# Patient Record
Sex: MALE | Race: WHITE | NOT HISPANIC OR LATINO | Employment: FULL TIME
[De-identification: names, ages, dates, MRNs, and addresses within clinical notes are randomized per-mention and may not be internally consistent; named-entity substitution may affect disease eponyms.]

---

## 2019-09-30 ENCOUNTER — HEALTH MAINTENANCE LETTER (OUTPATIENT)
Age: 59
End: 2019-09-30

## 2020-03-22 ENCOUNTER — HEALTH MAINTENANCE LETTER (OUTPATIENT)
Age: 60
End: 2020-03-22

## 2020-08-25 ENCOUNTER — AMBULATORY - HEALTHEAST (OUTPATIENT)
Dept: CARDIAC REHAB | Facility: HOSPITAL | Age: 60
End: 2020-08-25

## 2020-08-25 DIAGNOSIS — Z95.5 STENTED CORONARY ARTERY: ICD-10-CM

## 2020-09-01 ENCOUNTER — AMBULATORY - HEALTHEAST (OUTPATIENT)
Dept: CARDIAC REHAB | Facility: HOSPITAL | Age: 60
End: 2020-09-01

## 2020-09-01 DIAGNOSIS — Z95.5 STENTED CORONARY ARTERY: ICD-10-CM

## 2020-09-01 LAB
GLUCOSE BLDC GLUCOMTR-MCNC: 111 MG/DL (ref 70–139)
GLUCOSE BLDC GLUCOMTR-MCNC: 94 MG/DL (ref 70–139)

## 2020-09-03 ENCOUNTER — AMBULATORY - HEALTHEAST (OUTPATIENT)
Dept: CARDIAC REHAB | Facility: HOSPITAL | Age: 60
End: 2020-09-03

## 2020-09-03 DIAGNOSIS — Z95.5 STENTED CORONARY ARTERY: ICD-10-CM

## 2020-09-08 ENCOUNTER — AMBULATORY - HEALTHEAST (OUTPATIENT)
Dept: CARDIAC REHAB | Facility: HOSPITAL | Age: 60
End: 2020-09-08

## 2020-09-08 DIAGNOSIS — Z95.5 STENTED CORONARY ARTERY: ICD-10-CM

## 2020-09-10 ENCOUNTER — AMBULATORY - HEALTHEAST (OUTPATIENT)
Dept: CARDIAC REHAB | Facility: HOSPITAL | Age: 60
End: 2020-09-10

## 2020-09-10 DIAGNOSIS — Z95.5 STENTED CORONARY ARTERY: ICD-10-CM

## 2020-09-15 ENCOUNTER — AMBULATORY - HEALTHEAST (OUTPATIENT)
Dept: CARDIAC REHAB | Facility: HOSPITAL | Age: 60
End: 2020-09-15

## 2020-09-15 DIAGNOSIS — Z95.5 STENTED CORONARY ARTERY: ICD-10-CM

## 2020-09-17 ENCOUNTER — AMBULATORY - HEALTHEAST (OUTPATIENT)
Dept: CARDIAC REHAB | Facility: HOSPITAL | Age: 60
End: 2020-09-17

## 2020-09-17 DIAGNOSIS — Z95.5 STENTED CORONARY ARTERY: ICD-10-CM

## 2020-09-22 ENCOUNTER — AMBULATORY - HEALTHEAST (OUTPATIENT)
Dept: CARDIAC REHAB | Facility: HOSPITAL | Age: 60
End: 2020-09-22

## 2020-09-22 DIAGNOSIS — Z95.5 STENTED CORONARY ARTERY: ICD-10-CM

## 2020-09-24 ENCOUNTER — AMBULATORY - HEALTHEAST (OUTPATIENT)
Dept: CARDIAC REHAB | Facility: HOSPITAL | Age: 60
End: 2020-09-24

## 2020-09-24 DIAGNOSIS — Z95.5 STENTED CORONARY ARTERY: ICD-10-CM

## 2020-09-29 ENCOUNTER — AMBULATORY - HEALTHEAST (OUTPATIENT)
Dept: CARDIAC REHAB | Facility: HOSPITAL | Age: 60
End: 2020-09-29

## 2020-09-29 DIAGNOSIS — Z95.5 STENTED CORONARY ARTERY: ICD-10-CM

## 2020-10-01 ENCOUNTER — AMBULATORY - HEALTHEAST (OUTPATIENT)
Dept: CARDIAC REHAB | Facility: HOSPITAL | Age: 60
End: 2020-10-01

## 2020-10-01 DIAGNOSIS — Z95.5 STENTED CORONARY ARTERY: ICD-10-CM

## 2020-10-06 ENCOUNTER — AMBULATORY - HEALTHEAST (OUTPATIENT)
Dept: CARDIAC REHAB | Facility: HOSPITAL | Age: 60
End: 2020-10-06

## 2020-10-06 DIAGNOSIS — Z95.5 STENTED CORONARY ARTERY: ICD-10-CM

## 2020-10-08 ENCOUNTER — AMBULATORY - HEALTHEAST (OUTPATIENT)
Dept: CARDIAC REHAB | Facility: HOSPITAL | Age: 60
End: 2020-10-08

## 2020-10-08 DIAGNOSIS — Z95.5 STENTED CORONARY ARTERY: ICD-10-CM

## 2020-10-12 ENCOUNTER — AMBULATORY - HEALTHEAST (OUTPATIENT)
Dept: CARDIAC REHAB | Facility: HOSPITAL | Age: 60
End: 2020-10-12

## 2020-10-12 DIAGNOSIS — Z95.5 STENTED CORONARY ARTERY: ICD-10-CM

## 2020-10-14 ENCOUNTER — AMBULATORY - HEALTHEAST (OUTPATIENT)
Dept: CARDIAC REHAB | Facility: HOSPITAL | Age: 60
End: 2020-10-14

## 2020-10-14 DIAGNOSIS — Z95.5 STENTED CORONARY ARTERY: ICD-10-CM

## 2020-10-19 ENCOUNTER — AMBULATORY - HEALTHEAST (OUTPATIENT)
Dept: CARDIAC REHAB | Facility: HOSPITAL | Age: 60
End: 2020-10-19

## 2020-10-19 DIAGNOSIS — Z95.5 STENTED CORONARY ARTERY: ICD-10-CM

## 2020-10-21 ENCOUNTER — AMBULATORY - HEALTHEAST (OUTPATIENT)
Dept: CARDIAC REHAB | Facility: HOSPITAL | Age: 60
End: 2020-10-21

## 2020-10-21 DIAGNOSIS — Z95.5 STENTED CORONARY ARTERY: ICD-10-CM

## 2020-10-21 DIAGNOSIS — I21.4 NSTEMI (NON-ST ELEVATED MYOCARDIAL INFARCTION) (H): ICD-10-CM

## 2020-10-22 LAB
GLUCOSE BLDC GLUCOMTR-MCNC: 161 MG/DL (ref 70–139)
GLUCOSE BLDC GLUCOMTR-MCNC: 166 MG/DL (ref 70–139)

## 2020-10-26 ENCOUNTER — AMBULATORY - HEALTHEAST (OUTPATIENT)
Dept: CARDIAC REHAB | Facility: HOSPITAL | Age: 60
End: 2020-10-26

## 2020-10-26 DIAGNOSIS — Z95.5 STENTED CORONARY ARTERY: ICD-10-CM

## 2020-10-26 DIAGNOSIS — I21.4 NSTEMI (NON-ST ELEVATED MYOCARDIAL INFARCTION) (H): ICD-10-CM

## 2020-10-28 ENCOUNTER — AMBULATORY - HEALTHEAST (OUTPATIENT)
Dept: CARDIAC REHAB | Facility: HOSPITAL | Age: 60
End: 2020-10-28

## 2020-10-28 DIAGNOSIS — I21.4 NSTEMI (NON-ST ELEVATED MYOCARDIAL INFARCTION) (H): ICD-10-CM

## 2020-10-28 DIAGNOSIS — Z95.5 STENTED CORONARY ARTERY: ICD-10-CM

## 2020-11-02 ENCOUNTER — AMBULATORY - HEALTHEAST (OUTPATIENT)
Dept: CARDIAC REHAB | Facility: HOSPITAL | Age: 60
End: 2020-11-02

## 2020-11-02 DIAGNOSIS — Z95.5 STENTED CORONARY ARTERY: ICD-10-CM

## 2020-11-02 DIAGNOSIS — I21.4 NSTEMI (NON-ST ELEVATED MYOCARDIAL INFARCTION) (H): ICD-10-CM

## 2020-11-04 ENCOUNTER — AMBULATORY - HEALTHEAST (OUTPATIENT)
Dept: CARDIAC REHAB | Facility: HOSPITAL | Age: 60
End: 2020-11-04

## 2020-11-04 DIAGNOSIS — I21.4 NSTEMI (NON-ST ELEVATED MYOCARDIAL INFARCTION) (H): ICD-10-CM

## 2020-11-04 DIAGNOSIS — Z95.5 STENTED CORONARY ARTERY: ICD-10-CM

## 2020-11-09 ENCOUNTER — AMBULATORY - HEALTHEAST (OUTPATIENT)
Dept: CARDIAC REHAB | Facility: HOSPITAL | Age: 60
End: 2020-11-09

## 2020-11-09 DIAGNOSIS — Z95.5 STENTED CORONARY ARTERY: ICD-10-CM

## 2020-11-09 DIAGNOSIS — I21.4 NSTEMI (NON-ST ELEVATED MYOCARDIAL INFARCTION) (H): ICD-10-CM

## 2020-11-11 ENCOUNTER — AMBULATORY - HEALTHEAST (OUTPATIENT)
Dept: CARDIAC REHAB | Facility: HOSPITAL | Age: 60
End: 2020-11-11

## 2020-11-11 DIAGNOSIS — I21.4 NSTEMI (NON-ST ELEVATED MYOCARDIAL INFARCTION) (H): ICD-10-CM

## 2020-11-11 DIAGNOSIS — Z95.5 STENTED CORONARY ARTERY: ICD-10-CM

## 2020-11-30 ENCOUNTER — AMBULATORY - HEALTHEAST (OUTPATIENT)
Dept: CARDIAC REHAB | Facility: HOSPITAL | Age: 60
End: 2020-11-30

## 2020-11-30 DIAGNOSIS — I21.4 NSTEMI (NON-ST ELEVATED MYOCARDIAL INFARCTION) (H): ICD-10-CM

## 2020-11-30 DIAGNOSIS — Z95.5 STENTED CORONARY ARTERY: ICD-10-CM

## 2020-12-02 ENCOUNTER — AMBULATORY - HEALTHEAST (OUTPATIENT)
Dept: CARDIAC REHAB | Facility: HOSPITAL | Age: 60
End: 2020-12-02

## 2020-12-02 DIAGNOSIS — Z95.5 STENTED CORONARY ARTERY: ICD-10-CM

## 2020-12-02 DIAGNOSIS — I21.4 NSTEMI (NON-ST ELEVATED MYOCARDIAL INFARCTION) (H): ICD-10-CM

## 2020-12-07 ENCOUNTER — AMBULATORY - HEALTHEAST (OUTPATIENT)
Dept: CARDIAC REHAB | Facility: HOSPITAL | Age: 60
End: 2020-12-07

## 2020-12-07 DIAGNOSIS — Z95.5 STENTED CORONARY ARTERY: ICD-10-CM

## 2020-12-07 DIAGNOSIS — I21.4 NSTEMI (NON-ST ELEVATED MYOCARDIAL INFARCTION) (H): ICD-10-CM

## 2020-12-09 ENCOUNTER — AMBULATORY - HEALTHEAST (OUTPATIENT)
Dept: CARDIAC REHAB | Facility: HOSPITAL | Age: 60
End: 2020-12-09

## 2020-12-09 DIAGNOSIS — I21.4 NSTEMI (NON-ST ELEVATED MYOCARDIAL INFARCTION) (H): ICD-10-CM

## 2020-12-09 DIAGNOSIS — Z95.5 STENTED CORONARY ARTERY: ICD-10-CM

## 2020-12-14 ENCOUNTER — AMBULATORY - HEALTHEAST (OUTPATIENT)
Dept: CARDIAC REHAB | Facility: HOSPITAL | Age: 60
End: 2020-12-14

## 2020-12-14 DIAGNOSIS — Z95.5 STENTED CORONARY ARTERY: ICD-10-CM

## 2020-12-14 DIAGNOSIS — I21.4 NSTEMI (NON-ST ELEVATED MYOCARDIAL INFARCTION) (H): ICD-10-CM

## 2020-12-16 ENCOUNTER — AMBULATORY - HEALTHEAST (OUTPATIENT)
Dept: CARDIAC REHAB | Facility: HOSPITAL | Age: 60
End: 2020-12-16

## 2020-12-16 DIAGNOSIS — I21.4 NSTEMI (NON-ST ELEVATED MYOCARDIAL INFARCTION) (H): ICD-10-CM

## 2020-12-16 DIAGNOSIS — Z95.5 STENTED CORONARY ARTERY: ICD-10-CM

## 2020-12-21 ENCOUNTER — AMBULATORY - HEALTHEAST (OUTPATIENT)
Dept: CARDIAC REHAB | Facility: HOSPITAL | Age: 60
End: 2020-12-21

## 2020-12-21 DIAGNOSIS — I21.4 NSTEMI (NON-ST ELEVATED MYOCARDIAL INFARCTION) (H): ICD-10-CM

## 2020-12-21 DIAGNOSIS — Z95.5 STENTED CORONARY ARTERY: ICD-10-CM

## 2020-12-28 ENCOUNTER — AMBULATORY - HEALTHEAST (OUTPATIENT)
Dept: CARDIAC REHAB | Facility: HOSPITAL | Age: 60
End: 2020-12-28

## 2020-12-28 DIAGNOSIS — Z95.5 STENTED CORONARY ARTERY: ICD-10-CM

## 2020-12-28 DIAGNOSIS — I21.4 NSTEMI (NON-ST ELEVATED MYOCARDIAL INFARCTION) (H): ICD-10-CM

## 2020-12-30 ENCOUNTER — AMBULATORY - HEALTHEAST (OUTPATIENT)
Dept: CARDIAC REHAB | Facility: HOSPITAL | Age: 60
End: 2020-12-30

## 2020-12-30 DIAGNOSIS — Z95.5 STENTED CORONARY ARTERY: ICD-10-CM

## 2020-12-30 DIAGNOSIS — I21.4 NSTEMI (NON-ST ELEVATED MYOCARDIAL INFARCTION) (H): ICD-10-CM

## 2021-01-15 ENCOUNTER — HEALTH MAINTENANCE LETTER (OUTPATIENT)
Age: 61
End: 2021-01-15

## 2021-05-09 ENCOUNTER — HEALTH MAINTENANCE LETTER (OUTPATIENT)
Age: 61
End: 2021-05-09

## 2021-05-13 ENCOUNTER — RECORDS - HEALTHEAST (OUTPATIENT)
Dept: ADMINISTRATIVE | Facility: OTHER | Age: 61
End: 2021-05-13

## 2021-05-14 ENCOUNTER — RECORDS - HEALTHEAST (OUTPATIENT)
Dept: ADMINISTRATIVE | Facility: OTHER | Age: 61
End: 2021-05-14

## 2021-05-28 ENCOUNTER — RECORDS - HEALTHEAST (OUTPATIENT)
Dept: ADMINISTRATIVE | Facility: CLINIC | Age: 61
End: 2021-05-28

## 2021-06-04 VITALS — WEIGHT: 315 LBS | BODY MASS INDEX: 45.2 KG/M2

## 2021-06-04 VITALS — BODY MASS INDEX: 45.34 KG/M2 | WEIGHT: 315 LBS

## 2021-06-04 VITALS — BODY MASS INDEX: 45.63 KG/M2 | WEIGHT: 315 LBS

## 2021-06-04 VITALS — WEIGHT: 315 LBS | BODY MASS INDEX: 45.34 KG/M2

## 2021-06-05 VITALS — BODY MASS INDEX: 45.2 KG/M2 | WEIGHT: 315 LBS

## 2021-06-05 VITALS — WEIGHT: 315 LBS | BODY MASS INDEX: 45.34 KG/M2

## 2021-06-05 VITALS — WEIGHT: 315 LBS | BODY MASS INDEX: 46.06 KG/M2

## 2021-06-05 VITALS — BODY MASS INDEX: 45.63 KG/M2 | WEIGHT: 315 LBS

## 2021-06-05 VITALS — WEIGHT: 315 LBS | BODY MASS INDEX: 45.48 KG/M2

## 2021-06-05 VITALS — BODY MASS INDEX: 45.48 KG/M2 | WEIGHT: 315 LBS

## 2021-06-05 VITALS — BODY MASS INDEX: 46.06 KG/M2 | WEIGHT: 315 LBS

## 2021-06-05 VITALS — BODY MASS INDEX: 47.21 KG/M2 | WEIGHT: 315 LBS

## 2021-06-05 VITALS — WEIGHT: 315 LBS | BODY MASS INDEX: 45.92 KG/M2

## 2021-06-05 VITALS — BODY MASS INDEX: 45.34 KG/M2 | WEIGHT: 315 LBS

## 2021-06-05 VITALS — BODY MASS INDEX: 45.05 KG/M2 | WEIGHT: 314 LBS

## 2021-06-05 VITALS — BODY MASS INDEX: 47.35 KG/M2 | WEIGHT: 315 LBS

## 2021-06-05 VITALS — WEIGHT: 315 LBS | BODY MASS INDEX: 45.63 KG/M2

## 2021-06-05 VITALS — WEIGHT: 315 LBS | BODY MASS INDEX: 46.2 KG/M2

## 2021-06-09 ENCOUNTER — RECORDS - HEALTHEAST (OUTPATIENT)
Dept: ADMINISTRATIVE | Facility: CLINIC | Age: 61
End: 2021-06-09

## 2021-06-11 NOTE — PROGRESS NOTES
Cardiac Rehab  Phase II Assessment    Assessment Date: 9/1/2020    Diagnosis: PCI/Stent  Date of Onset: 8/18/2020  Procedure: PCI?Stent  Date of Onset: 8/18/2020  ICD/Pacemaker: Yes   Post-op Complications:   ECG History: ICD/V-Fib; NSR; Right % EF%:30%  Past Medical History:   Patient Active Problem List   Diagnosis     Morbid Obesity     Hypertension     Ischemic Cardiomyopathy     Type 2 Diabetes Mellitus     Hyperlipidemia   CVA; ICM:;STEMI; Stent 2005; CHF NYHA Class 1;   ICD 2015; Recent  ICD Shocks; 8/11 and 8/12/2020  Chronic Systolic CHF;  5th toe amputation Right Foot      Physical Assessment  Precautions/ Physical Limitations: Low EF;  Recent toe amputation   Oxygen: No  O2 Sats: 97-99 Lung Sounds:  Edema:   Incisions:   Sleeping Pattern: good   Appetite: good   Nutrition Risk Screen: Weight loss    Pain  Location:   Characteristics:  Intensity: (0-10 scale) 0  Current Pain Management:   Intervention:   Response:     Psychosocial/ Emotional Health  1. In the past 12 months, have you been in a relationship where you have been abused physically, emotionally, sexually or financially? No  notified: NA  2. Who do you turn to for emotional support?: Wife  3. Do you have cultural or spiritual needs? No  4. Have there been any major life changes in the past 12 months? No    Referral Information  Primary Physician: Jaime Cassidy MD  Cardiologist:   Surgeon:     Home exercise/Equipment: Treadmill    Patient's long-term goal(s): Resume Reffing Basketball    1. Living Accommodations: Home Steps: Yes      Support people at home: Wife and 2 kids   2. Marital Status:   3. Family is able to assist with cares      Religious/Community involvement: Yes  4. Recreation/Hobbies: [employer]  Reffing basketball and Football          See Doc Flowsheet

## 2021-06-11 NOTE — PROGRESS NOTES
ITP ASSESSMENT   Assessment Day: Initial    Session Number: 1  Precautions:   Recent 5th toe amp on Right foot ;   Low EF    Diagnosis: Stent;MI;CHF    Risk Stratification: High    Referring Provider: Keyshawn Torres MD  EXERCISE  Exercise Assessment: Initial       6 Minute Walk Test   Pre   Pre Exercise HR: 63                    Pre Exercise BP: 148/67      Peak  Peak HR: 85                   Peak BP: 153/65    Peak feet: 1125    Peak O2 SAT: 99    Peak RPE: 5    Peak MPH: 2.13      Symptoms:  Peak Symptoms: NONE      5 mins. Post  5 Min Post HR: 66    5 Min Post BP: 130/64                           Exercise Plan  Goals Next 30 days  : ST Goal;  Pt will tolerate 40 mins of ex at 2.6-3.2 mets without CV symptoms ;  ST Goal # 2  Pt will walk 2-3 x daiy; 5-10 Mins without CV Symptoms    Goal #3:  Pt will lose 2-3 # per month with Portion control and increasing Activity    : LTG; Pt will tolerate activity at 4-5 Mets to tolerate Reffing basketball games,         Education Goals: All goals in this section met    Education Goals Met: Patient can state cardiac s/s and appropriate emergency response.;Medication review.;Has system for taking medication.      Exercise Prescription  Exercise Mode: Treadmill;Bike;Nustep;Arm Erg.    Frequency: 2 x week    Duration: 40 Mins    Intensity / THR: 20-30 beats above resting heart rate    RPE 11-14  Progression / Met level: 2.6-3.2    Resistive Training?: Yes      Current Exercise (mins/week): 1      Interventions  Home Exercise:  Mode: Walk    Frequency: 2-3 x daily    Duration: 5-10 Mins      Education Material : Educational videos;Provide written material;Individual education and counseling      Education Completed  Exercise Education Completed: Signs and Symptoms;Medication review;Emergency Plan;RPE;Home Exercise;Warm up/cool down;FITT Principles;BP/HR Reponse to exercise;Benefits of Exercise;End point of exercise              Exercise Follow-up/Discharge  Follow up/Discharge:  "Skilled Therapy required to Monitor CV response to increasing Met levels;  To provide risk factor education and suppoer with Modifications;  Pt's ICD recently went off x 2 in August  Needs continued monitoring with increasing activity   NUTRITION  Nutrition Assessment: Initial      Nutrition Risk Factors:  Nutrition Risk Factors: Diabetes;Dyslipidemia;Overweight  HbA1c: 9.6  Monitors blood sugar at home: Yes  Frequency: 3-4 x day  Cholesterol: 97  LDL: 74  HDL: 25  Triglycerides: 77      Nutrition Plan  Interventions  Other Nutrition Intervention: Therapist/Pt Discussion;Educational Videos;Provide with Written Material    Initial Rate Your Plate Score: 48      Education Completed  Nutrition Education Completed: Risk factor overview;Low sodium diet      Goals  Nutrition Goals (Next 30 days): Provide Rate your Plate Survey;Review Dietitian schedule;Patient will follow a low sodium diet;Patient will lose weight;Patient demonstrated understanding of cardiac nutrition, no goals identified for the next 30 days      Goals Met  Nutrition Goals Met: Patient can identify their risk factors for CAD;Provided Rate your Plate Survey;Reviewed Dietitian schedule      Height, Weight, and  BMI  Weight: 315 lb (142.9 kg)  Height: 5' 10\" (1.778 m)  BMI: 45.2      Nutrition Follow-up  Follow-up/Discharge: Will set up a tome for pt to meet with the dietician         Other Risk Factors  Other Risk Factor Assessment: Initial      HTN Risk Factor: Hypertension      Pre Exercise BP: 148/67  Post Exercise BP: 130/64      Hypertension Plan  Goals  HTN Goals: Follow low sodium diet;Take medication as prescribed;Exercises regularly      Goals Met  HTN Goals Met: Take medication as prescribed;Follow low sodium diet      HTN Interventions  HTN Interventions: Diet consult;Therapist/patient discussion;Provide written material;Offer educational videos      HTN Education Completed  HTN Education Completed: Medication review;Risk factor " overview      Tobacco Risk Factor: NA        Risk Factor Follow-up   Follow-up/Discharge: Will provide risk factor ed as needed     PSYCHOSOCIAL  Psychosocial Assessment: Initial       [facility] MINA Q of L Summary Score: 15      PHQ-9 Total Score: 0      Psychosocial Risk Factor: NA      Psychosocial Plan  Interventions    Interventions: Provide written material;Individual education and counseling       Education Completed  Education Completed: Effects of stress on body      Goals  Goals (Next 30 days): Practicing stress management skills      Goals Met  Goals Met: Identified Support system;Oriented to stress management classes;Identify stressors      Psychosocial Follow-up  Follow-up/Discharge: Denied having stess             Patient involved in Goal setting?: Yes      Signature: _____________________________________________________________    Date: __________________    Time: ________________

## 2021-06-11 NOTE — PROGRESS NOTES
ITP ASSESSMENT   Assessment Day: 30 Day    Session Number: 9  Precautions: Low EF, toe amputation    Diagnosis: Stent;MI;CHF    Risk Stratification: High    Referring Provider: Jaime Cassidy MD  EXERCISE  Exercise Assessment: Reassessment                            Exercise Plan  Goals Next 30 days  STG: Tolerate 2.5-3 METs for 40 minutes without CV sx    STG: Lose 2-3lbs     LTG; Pt will tolerate activity at 4-5 Mets to tolerate Reffing basketball games,       Education Goals: All goals in this section met    Education Goals Met: Patient can state cardiac s/s and appropriate emergency response.;Has system for taking medication.;Medication review.                          Goals Met  Initial ADL's goals met: Pt has reached 2.7 METs - goal met    Initial Leisure goals met: Pt has not lost wt - goal not met      Initial Progression: Pt is progressing slowly, limited by recent toe amputation (does not tolerate standing for long periods of time), and general deconditioning.      Exercise Prescription  Exercise Mode: Treadmill;Bike;Nustep;Arm Erg.    Frequency: 2x/week    Duration: 40 minutes    Intensity / THR: 20-30 beats above resting heart rate    RPE 11-14  Progression / Met level: 2.5-3    Resistive Training?: Yes      Current Exercise (mins/week): 80      Interventions  Home Exercise:  Mode: Walk    Frequency: 2-3x/day    Duration: 5-10 minutes      Education Material : Educational videos;Provide written material;Individual education and counseling      Education Completed  Exercise Education Completed: Signs and Symptoms;Medication review;Emergency Plan;RPE;Home Exercise;Warm up/cool down;FITT Principles;BP/HR Reponse to exercise;Benefits of Exercise;End point of exercise              Exercise Follow-up/Discharge  Follow up/Discharge: Skilled Therapy required to Monitor CV response to increasing MET levels;  To provide risk factor education and support with Modifications;  Pt's ICD recently went off x 2 in August   "Needs continued monitoring with increasing activity           NUTRITION  Nutrition Assessment: Reassessment      Nutrition Risk Factors:  Nutrition Risk Factors: Diabetes;Dyslipidemia;Overweight  HbA1c: 9.6  Monitors blood sugar at home: Yes  Frequency: 3-4x/day  Cholesterol: 97  LDL: 47  HDL: 25  Triglycerides: 77      Nutrition Plan  Interventions  Diet Consult: Completed    Other Nutrition Intervention: Diet Class;Therapist/Pt Discussion;Educational Videos;Provide with Written Material    Initial Rate Your Plate Score: 48      Education Completed  Nutrition Education Completed: Low Saturated fat diet;Low sodium diet;Weight management;Carbohydrate counting;Risk factor overview      Goals  Nutrition Goals (Next 30 days): Patient will lose weight;Improve Rate Your Plate Survey Score      Goals Met  Nutrition Goals Met: Patient states following a low saturated fat diet;Patient can identify their risk factors for CAD;Completed Nutritional Risk Screen;Provided Rate your Plate Survey;Reviewed Dietitian schedule      Height, Weight, and  BMI  Weight: 317 lb (143.8 kg)  Height: 5' 10\" (1.778 m)  BMI: 45.48      Nutrition Follow-up  Follow-up/Discharge: RD available for questions as needed       Other Risk Factors  Other Risk Factor Assessment: Reassessment      HTN Risk Factor: Hypertension      Pre Exercise BP: 92/60  Post Exercise BP: 124/62      Hypertension Plan  Goals  HTN Goals: Follow low sodium diet;Take medication as prescribed;Exercises regularly      Goals Met  HTN Goals Met: Take medication as prescribed;Follow low sodium diet      HTN Interventions  HTN Interventions: Diet consult;Therapist/patient discussion;Provide written material;Offer educational videos      HTN Education Completed  HTN Education Completed: Medication review;Risk factor overview      Tobacco Risk Factor: NA      Risk Factor Follow-up   Follow-up/Discharge: Reviewed risk factors, will continue to provide education         " PSYCHOSOCIAL  Psychosocial Assessment: Reassessment       [facility] MIAN Q of L Summary Score: 15      PHQ-9 Total Score: 0      Psychosocial Risk Factor: NA      Psychosocial Plan  Interventions  Interventions: Provide written material;Individual education and counseling;Offer educational videos and classes      Education Completed  Education Completed: Relaxation/Coping Techniques;Effects of stress on body      Goals  Goals (Next 30 days): Patient demonstrates understanding of stress, no goals identified for the next 30 days      Goals Met  Goals Met: Identified Support system;Oriented to stress management classes;Identify stressors;Practicing stress management skills      Psychosocial Follow-up  Follow-up/Discharge: Continues to deny stress or psychosocial needs at this time. Reviewed effects of stress on the heart and importance of effective stress mgmt           Patient involved in Goal setting?: Yes      Signature: _____________________________________________________________    Date: __________________    Time: __________________

## 2021-06-12 NOTE — PROGRESS NOTES
ITP ASSESSMENT   Assessment Day: 60 Day    Session Number: 17  Precautions: Low EF, R toe amputation    Diagnosis: Stent;MI;CHF    Risk Stratification: High    Referring Provider: Jaime Cassidy MD  EXERCISE  Exercise Assessment: Reassessment    Pt has attending 17 sessions of rehab at a peak of 2.8 METs without CV sx, see doc flowsheets                         Exercise Plan  Goals Next 30 days  STG: Tolerate 2.5-3 METs for 40 minutes without CV sx     STG: Lose 2-3lbs     LTG; Pt will tolerate activity at 4-5 Mets to tolerate Reffing basketball games,       Education Goals: All goals in this section met    Education Goals Met: Patient can state cardiac s/s and appropriate emergency response.;Has system for taking medication.;Medication review.                          Goals Met  30 day ADL'S goals met: Pt has reached 2.8 METs - goal met    30 day Leisure goals met: Pt has lost 2 lbs- goal met      30 Day Progression: Pt is making slow gradual progress. Limited by fatigue and lack of endurance      Initial ADL's goals met: Pt has reached 2.7 METs - goal met    Initial Leisure goals met: Pt has not lost wt - goal not met      Initial Progression: Pt is progressing slowly, limited by recent toe amputation (does not tolerate standing for long periods of time), and general deconditioning.      Exercise Prescription  Exercise Mode: Treadmill;Bike;Nustep;Arm Erg.    Frequency: 2x/week    Duration: 40 minutes    Intensity / THR: 20-30 beats above resting heart rate    RPE 11-14  Progression / Met level: 2.5-3    Resistive Training?: Yes      Current Exercise (mins/week): 120      Interventions  Home Exercise:  Mode: Walk    Frequency: 4-5x/week    Duration: 15-25 minutes      Education Material : Educational videos;Provide written material;Individual education and counseling      Education Completed  Exercise Education Completed: Signs and Symptoms;Medication review;Emergency Plan;RPE;Home Exercise;Warm up/cool down;FITT  "Principles;BP/HR Reponse to exercise;Benefits of Exercise;End point of exercise              Exercise Follow-up/Discharge  Follow up/Discharge: Skilled Therapy required to Monitor CV response to increasing MET levels;  To provide risk factor education and support with Modifications;  Pt's ICD recently went off x 2 in August  Needs continued monitoring with increasing activity   NUTRITION  Nutrition Assessment: Reassessment      Nutrition Risk Factors:  Nutrition Risk Factors: Diabetes;Dyslipidemia;Overweight  HbA1c: 9.6  Monitors blood sugar at home: Yes  Frequency: 3-4x/day  Cholesterol: 97  LDL: 47  HDL: 25  Triglycerides: 77      Nutrition Plan  Interventions  Diet Consult: Completed    Other Nutrition Intervention: Diet Class;Therapist/Pt Discussion;Educational Videos;Provide with Written Material    Initial Rate Your Plate Score: 48      Education Completed  Nutrition Education Completed: Low Saturated fat diet;Low sodium diet;Weight management;Carbohydrate counting;Risk factor overview      Goals  Nutrition Goals (Next 30 days): Patient will lose weight;Improve Rate Your Plate Survey Score      Goals Met  Nutrition Goals Met: Patient states following a low saturated fat diet;Patient can identify their risk factors for CAD;Completed Nutritional Risk Screen;Provided Rate your Plate Survey;Reviewed Dietitian schedule      Height, Weight, and  BMI  Weight: 315 lb (142.9 kg)  Height: 5' 10\" (1.778 m)  BMI: 45.2      Nutrition Follow-up  Follow-up/Discharge: RD available for questions as needed         Other Risk Factors  Other Risk Factor Assessment: Reassessment      HTN Risk Factor: Hypertension      Pre Exercise BP: 108/58  Post Exercise BP: 112/62      Hypertension Plan  Goals  HTN Goals: Patient demonstrates understanding of HTN, no goals identified for the next 30 days      Goals Met  HTN Goals Met: Follow low sodium diet;Take medication as prescribed;Exercises regularly      HTN Interventions  HTN " Interventions: Diet consult;Therapist/patient discussion;Provide written material;Offer educational videos;Offer educational classes      HTN Education Completed  HTN Education Completed: Medication review;Risk factor overview;Low sodium diet      Tobacco Risk Factor: NA      Risk Factor Follow-up   Follow-up/Discharge: Will continue to provide education     PSYCHOSOCIAL  Psychosocial Assessment: Reassessment       Terrence MCKEON zaynab L Summary Score: 15      PHQ-9 Total Score: 0      Psychosocial Risk Factor: NA      Psychosocial Plan  Interventions  Interventions: Provide written material;Individual education and counseling;Offer educational videos and classes      Education Completed  Education Completed: Relaxation/Coping Techniques;Effects of stress on body      Goals  Goals (Next 30 days): Patient demonstrates understanding of stress, no goals identified for the next 30 days      Goals Met  Goals Met: Identified Support system;Oriented to stress management classes;Identify stressors;Practicing stress management skills      Psychosocial Follow-up  Follow-up/Discharge: Reviewed effects of stress on the heart, pt continued to deny stress at this time             Patient involved in Goal setting?: Yes      Signature: _____________________________________________________________    Date: __________________    Time: __________________

## 2021-06-13 NOTE — PROGRESS NOTES
ITP ASSESSMENT   Assessment Day: 90 Day    Session Number: 22  Precautions: Low EF, R Toe Amputation    Diagnosis: Stent;MI;CHF    Risk Stratification: High    Referring Provider: Jaime Cassidy MD   ITP: Dr. Del Toro  EXERCISE  Exercise Assessment: Reassessment    Pt has attended 22 sessions of Phase II Cardiac Rehab. Tolerating interval training with Met levels up to 6.7 mets for 30 second bouts. Pt has not had any cardiac symptoms or EKG changes. Pt is currently on hold due to Covid-19.                          Exercise Plan  Goals Next 30 days  ADL'S: STG: Tolerate 3-4 METs for 40 minutes without CV sx    Leisure: STG: Lose 2-3lbs    Work: LTG; Pt will tolerate activity at 6-7 Mets to tolerate Reffing basketball games,       Education Goals: All goals in this section met    Education Goals Met: Patient can state cardiac s/s and appropriate emergency response.;Has system for taking medication.;Medication review.                          Goals Met  60 day ADL'S goals met: Goal Met - Pt does interval training program reaching up to 6.7 mets for 30 seconds at at time.    60 day Leisure goals met: Goal Not Met - Pt has maintained his weight throughout the program within 2 lbs from when he started.    60 day Work goals met: Goal Not Met - Pt is tolerating met levels for short bouts and will increase as tolerated.    60 Day Progression: Pt is progressing well towards his goals. Pt is currently on hold due to Covid-19.      30 day ADL'S goals met: Pt has reached 2.8 METs - goal met    30 day Leisure goals met: Pt has lost 2 lbs- goal met    30 Day Progression: Pt is making slow gradual progress. Limited by fatigue and lack of endurance      Initial ADL's goals met: Pt has reached 2.7 METs - goal met    Initial Leisure goals met: Pt has not lost wt - goal not met    Initial Progression: Pt is progressing slowly, limited by recent toe amputation (does not tolerate standing for long periods of time), and general  deconditioning.      Exercise Prescription  Exercise Mode: Treadmill;Bike;Nustep;Arm Erg.    Frequency: 2x/week    Duration: 40 Minutes    Intensity / THR: 20-30 beats above resting heart rate    RPE 11-14  Progression / Met level: 2.5-3    Resistive Training?: Yes      Current Exercise (mins/week): 120      Interventions  Home Exercise:  Mode: Walk    Frequency: 4-5x/week    Duration: 15-25 Minutes      Education Material : Educational videos;Provide written material;Individual education and counseling      Education Completed  Exercise Education Completed: Signs and Symptoms;Medication review;Emergency Plan;RPE;Home Exercise;Warm up/cool down;FITT Principles;BP/HR Reponse to exercise;Benefits of Exercise;End point of exercise              Exercise Follow-up/Discharge  Follow up/Discharge: Skilled Therapy required to Monitor CV response to increasing MET levels;  To provide risk factor education and support with Modifications;  Pt's ICD recently went off x 2 in August  Needs continued monitoring with increasing activity   NUTRITION  Nutrition Assessment: Reassessment      Nutrition Risk Factors:  Nutrition Risk Factors: Diabetes;Dyslipidemia;Overweight  HbA1c: 9.6  Monitors blood sugar at home: Yes  Frequency: 3-4x/day  Cholesterol: 97  LDL: 47  HDL: 25  Triglycerides: 77      Nutrition Plan  Interventions  Diet Consult: Completed    Other Nutrition Intervention: Diet Class;Therapist/Pt Discussion;Educational Videos;Provide with Written Material    Initial Rate Your Plate Score: 48      Education Completed  Nutrition Education Completed: Low Saturated fat diet;Low sodium diet;Weight management;Carbohydrate counting;Risk factor overview      Goals  Nutrition Goals (Next 30 days): Patient will lose weight;Improve Rate Your Plate Survey Score      Goals Met  Nutrition Goals Met: Patient states following a low saturated fat diet;Patient can identify their risk factors for CAD;Completed Nutritional Risk Screen;Provided  "Rate your Plate Survey;Reviewed Dietitian schedule      Height, Weight, and  BMI  Weight: 317 lb (143.8 kg)  Height: 5' 10\" (1.778 m)  BMI: 45.48      Nutrition Follow-up  Follow-up/Discharge: RD available for questions as needed         Other Risk Factors  Other Risk Factor Assessment: Reassessment      HTN Risk Factor: Hypertension      Pre Exercise BP: 128/62  Post Exercise BP: 130/64      Hypertension Plan  Goals  HTN Goals: Patient demonstrates understanding of HTN, no goals identified for the next 30 days      Goals Met  HTN Goals Met: Follow low sodium diet;Take medication as prescribed;Exercises regularly      HTN Interventions  HTN Interventions: Diet consult;Therapist/patient discussion;Provide written material;Offer educational videos;Offer educational classes      HTN Education Completed  HTN Education Completed: Medication review;Risk factor overview;Low sodium diet      Tobacco Risk Factor: NA      Risk Factor Follow-up   Follow-up/Discharge: Will continue to provide education     PSYCHOSOCIAL  Psychosocial Assessment: Reassessment       Terrence ENRIQUEZ Q of L Summary Score: 15      PHQ-9 Total Score: 0      Psychosocial Risk Factor: NA      Psychosocial Plan  Interventions  Interventions: Provide written material;Individual education and counseling;Offer educational videos and classes      Education Completed  Education Completed: Relaxation/Coping Techniques;Effects of stress on body      Goals  Goals (Next 30 days): Patient demonstrates understanding of stress, no goals identified for the next 30 days      Goals Met  Goals Met: Identified Support system;Oriented to stress management classes;Identify stressors;Practicing stress management skills      Psychosocial Follow-up  Follow-up/Discharge: Reviewed effects of stress on the heart, pt continued to deny stress at this time       Patient involved in Goal setting?: No      Signature: " _____________________________________________________________    Date: __________________    Time: __________________

## 2021-06-14 NOTE — PROGRESS NOTES
ITP ASSESSMENT   Assessment Day: 120 Day    Session Number: 0  Precautions: Low EF    Diagnosis: Stent;MI;CHF    Risk Stratification: High    Referring Provider: Jaime Cassidy MD  EXERCISE  Exercise Assessment: Reassessment                           Exercise Plan  Goals Next 30 days   STG: Tolerate 8-10 METs for 40 minutes without CV sx    STG: Lose 2-3lbs     LTG; Pt will tolerate activity at 6-7 Mets to tolerate Reffing basketball games,       Education Goals: All goals in this section met    Education Goals Met: Patient can state cardiac s/s and appropriate emergency response.;Has system for taking medication.;Medication review.                          Goals Met  90 day ADL'S goals met: Pt has reached 8-10 METs - goal met    90 day Leisure goals met: Pt has not lost wt    90 Day Progress: Pt is progressing well, can tolerate 8-10 METs for short high intensity intervals      60 day ADL'S goals met: Goal Met - Pt does interval training program reaching up to 6.7 mets for 30 seconds at at time.    60 day Leisure goals met: Goal Not Met - Pt has maintained his weight throughout the program within 2 lbs from when he started.    60 day Work goals met: Goal Not Met - Pt is tolerating met levels for short bouts and will increase as tolerated.    60 Day Progression: Pt is progressing well towards his goals. Pt is currently on hold due to Covid-19.      30 day ADL'S goals met: Pt has reached 2.8 METs - goal met    30 day Leisure goals met: Pt has lost 2 lbs- goal met    30 Day Progression: Pt is making slow gradual progress. Limited by fatigue and lack of endurance      Initial ADL's goals met: Pt has reached 2.7 METs - goal met    Initial Leisure goals met: Pt has not lost wt - goal not met    Initial Progression: Pt is progressing slowly, limited by recent toe amputation (does not tolerate standing for long periods of time), and general deconditioning.      Exercise Prescription  Exercise Mode: Nustep    Frequency:  2x/week    Duration: 40 minutes    Intensity / THR: 20-30 beats above resting heart rate    RPE 11-14  Progression / Met level: 8-10    Resistive Training?: Yes      Current Exercise (mins/week): 120      Interventions  Home Exercise:  Mode: Walk    Frequency: 4-5x/week    Duration: 30-40 minutes      Education Material : Educational videos;Provide written material;Individual education and counseling      Education Completed  Exercise Education Completed: Signs and Symptoms;Medication review;Emergency Plan;RPE;Home Exercise;Warm up/cool down;FITT Principles;BP/HR Reponse to exercise;Benefits of Exercise;End point of exercise              Exercise Follow-up/Discharge  Follow up/Discharge: Skilled Therapy required to Monitor CV response to increasing MET levels;  To provide risk factor education and support with Modifications;  Pt's ICD recently went off x 2 in August  Needs continued monitoring with increasing activity. Will continue to use HIIT training to increase stamina.   NUTRITION  Nutrition Assessment: Reassessment      Nutrition Risk Factors:  Nutrition Risk Factors: Diabetes;Dyslipidemia;Overweight  HbA1c: 9.6  Monitors blood sugar at home: Yes  Frequency: 3-4x/day  Cholesterol: 97  LDL: 4  HDL: 25  Triglycerides: 77      Nutrition Plan  Interventions  Diet Consult: Completed    Other Nutrition Intervention: Diet Class;Therapist/Pt Discussion;Educational Videos;Provide with Written Material    Initial Rate Your Plate Score: 48    No data recorded    Education Completed  Nutrition Education Completed: Low Saturated fat diet;Low sodium diet;Weight management;Carbohydrate counting;Risk factor overview      Goals  Nutrition Goals (Next 30 days): Patient will lose weight;Improve Rate Your Plate Survey Score      Goals Met  Nutrition Goals Met: Patient states following a low saturated fat diet;Patient can identify their risk factors for CAD;Completed Nutritional Risk Screen;Provided Rate your Plate Survey;Reviewed  "Dietitian schedule      Height, Weight, and  BMI  Weight: 330 lb (149.7 kg)  Height: 5' 10\" (1.778 m)  BMI: 47.35      Nutrition Follow-up  Follow-up/Discharge: Will review heart healthy nutrition when pt returns         Other Risk Factors  Other Risk Factor Assessment: Reassessment      HTN Risk Factor: Hypertension      Pre Exercise BP: 148/76  Post Exercise BP: 124/66      Hypertension Plan  Goals  HTN Goals: Patient demonstrates understanding of HTN, no goals identified for the next 30 days      Goals Met  HTN Goals Met: Follow low sodium diet;Take medication as prescribed;Exercises regularly      HTN Interventions  HTN Interventions: Diet consult;Therapist/patient discussion;Provide written material;Offer educational videos;Offer educational classes      HTN Education Completed  HTN Education Completed: Medication review;Risk factor overview;Low sodium diet      Tobacco Risk Factor: NA      Risk Factor Follow-up   Follow-up/Discharge: will review other risk factors when pt returns     PSYCHOSOCIAL  Psychosocial Assessment: Reassessment       [place] ENRIQUEZ Q of L Summary Score: 15      PHQ-9 Total Score: 0      Psychosocial Risk Factor: NA      Psychosocial Plan  Interventions  Interventions: Provide written material;Individual education and counseling;Offer educational videos and classes      Education Completed  Education Completed: Relaxation/Coping Techniques;Effects of stress on body      Goals  Goals (Next 30 days): Patient demonstrates understanding of stress, no goals identified for the next 30 days      Goals Met  Goals Met: Identified Support system;Oriented to stress management classes;Identify stressors;Practicing stress management skills      Psychosocial Follow-up  Follow-up/Discharge: Will review stress and psychosocial needs when pt resumes             Patient involved in Goal setting?: No      Signature: _____________________________________________________________    Date: " __________________    Time: __________________

## 2021-06-14 NOTE — PROGRESS NOTES
ITP ASSESSMENT   Assessment Day: 150 Day    Session Number: 31  Precautions: Low EF    Diagnosis: Stent;MI;CHF    Risk Stratification: High    Referring Provider: Jaime Cassidy MD  EXERCISE  Exercise Assessment: Discharge       6 Minute Walk Test   Pre   Pre Exercise HR: 81                    Pre Exercise BP: 150/70      Peak  Peak HR: 115                   Peak BP: 168/80    Peak feet: 1250    Peak O2 SAT: 97    Peak RPE: 6    Peak MPH: 2.37      Symptoms:  Peak Symptoms: SOB    5 mins. Post  5 Min Post HR: 85    5 Min Post BP: 150/68                       Education Goals: All goals in this section met    Education Goals Met: Patient can state cardiac s/s and appropriate emergency response.;Has system for taking medication.;Medication review.             Goals Met                                              120 day ADL'S goals met: Met. Pt is exercising on average 10 mets of exercise.    120 day Leisure goals met: Not met. Pt has gradually increasing in weight.    No data recorded  120 Day Progress: Pt from an exercise point has progressed, however continues to gain weight.      90 day ADL'S goals met: Pt has reached 8-10 METs - goal met    90 day Leisure goals met: Pt has not lost wt    90 Day Progress: Pt is progressing well, can tolerate 8-10 METs for short high intensity intervals      60 day ADL'S goals met: Goal Met - Pt does interval training program reaching up to 6.7 mets for 30 seconds at at time.    60 day Leisure goals met: Goal Not Met - Pt has maintained his weight throughout the program within 2 lbs from when he started.    60 day Work goals met: Goal Not Met - Pt is tolerating met levels for short bouts and will increase as tolerated.    60 Day Progression: Pt is progressing well towards his goals. Pt is currently on hold due to Covid-19.      30 day ADL'S goals met: Pt has reached 2.8 METs - goal met    30 day Leisure goals met: Pt has lost 2 lbs- goal met    No data recorded  30 Day Progression:  Pt is making slow gradual progress. Limited by fatigue and lack of endurance      Initial ADL's goals met: Pt has reached 2.7 METs - goal met    Initial Leisure goals met: Pt has not lost wt - goal not met    Initial Progression: Pt is progressing slowly, limited by recent toe amputation (does not tolerate standing for long periods of time), and general deconditioning.      Exercise Prescription  Exercise Mode: Nustep    Frequency: 2x/week    Duration: 40 minutes    Intensity / THR: 20-30 beats above resting heart rate    RPE 11-14  Progression / Met level: 8-10    Resistive Training?: Yes      Current Exercise (mins/week): 120      Interventions  Home Exercise:  Mode: walk    Frequency: 4-5x/week    Duration: 30-40 min      Education Material : Educational videos;Provide written material;Individual education and counseling      Education Completed  Exercise Education Completed: Signs and Symptoms;Medication review;Emergency Plan;RPE;Home Exercise;Warm up/cool down;FITT Principles;BP/HR Reponse to exercise;Benefits of Exercise;End point of exercise              Exercise Follow-up/Discharge  Follow up/Discharge: Pt is discharging today after 31 sessions. He had great attendance and effort but was not interested in much learning regarding diagnosis, risk factors, exercise, etc. He pushed himself during interval training.    NUTRITION  Nutrition Assessment: Discharge      Nutrition Risk Factors:  Nutrition Risk Factors: Diabetes;Dyslipidemia;Overweight  HbA1c: 7.5 (12/10/2020)  Monitors blood sugar at home: Yes  Frequency: 3-4x/day  Cholesterol: 97  LDL: 4  HDL: 25  Triglycerides: 77      Nutrition Plan  Interventions  Diet Consult: Completed    Other Nutrition Intervention: Diet Class;Therapist/Pt Discussion;Educational Videos;Provide with Written Material    Initial Rate Your Plate Score: 48    Pre Initial Rate Your Plate Score: 48   Follow-Up Rate Your Plate Score: 59      Education Completed  Nutrition Education  "Completed: Low Saturated fat diet;Low sodium diet;Weight management;Carbohydrate counting;Risk factor overview      Goals  Nutrition Goals (Next 30 days): Patient will lose weight      Goals Met  Nutrition Goals Met: Patient states following a low saturated fat diet;Patient can identify their risk factors for CAD;Completed Nutritional Risk Screen;Provided Rate your Plate Survey;Reviewed Dietitian schedule      Height, Weight, and  BMI  Weight: 329 lb (149.2 kg)  Height: 5' 10\" (1.778 m)  BMI: 47.21    Pre BMI: 47.21     Nutrition Follow-up  Follow-up/Discharge: Pts weight is up 15lb since start of care. He reports trying to eat a healthy diet.          Other Risk Factors  Other Risk Factor Assessment: Discharge      HTN Risk Factor: Hypertension      Pre Exercise BP: 150/78  Post Exercise BP: 152/76      Hypertension Plan  Goals  HTN Goals: Follow low sodium diet;Take medication as prescribed;Exercises regularly      Goals Met  HTN Goals Met: Take medication as prescribed      HTN Interventions  HTN Interventions: Diet consult;Therapist/patient discussion;Provide written material;Offer educational videos;Offer educational classes      HTN Education Completed  HTN Education Completed: Medication review;Risk factor overview;Low sodium diet      Tobacco Risk Factor: NA      Risk Factor Follow-up   Follow-up/Discharge: Pts BP have been running high, reiterated a diet low in salt and as his weight is increasing this most likely will increase his BP     PSYCHOSOCIAL  Psychosocial Assessment: Discharge       [place] COOP Q of L Summary Score: 13    [facility]: 13     PHQ-9 Total Score: 0    Pre PHQ-9 Total Score: 0     Psychosocial Risk Factor: NA      Psychosocial Plan  Interventions  If PHQ-9 is >9, send letter to MD  Interventions: Provide written material;Individual education and counseling;Offer educational videos and classes      Education Completed  Education Completed: " Relaxation/Coping Techniques;Effects of stress on body      Goals  Goals (Next 30 days): Patient demonstrates understanding of stress, no goals identified for the next 30 days      Goals Met  Goals Met: Identified Support system;Oriented to stress management classes;Identify stressors;Practicing stress management skills      Psychosocial Follow-up  Follow-up/Discharge: Denies stress as a current risk factor for him.             Patient involved in Goal setting?: No      Signature: _____________________________________________________________    Date: __________________    Time: __________________See Doc Flowsheet

## 2021-06-14 NOTE — PATIENT INSTRUCTIONS - HE
[facility] Exercise Program    Exercise Goal: American [employer] Association recommends 200-250 minutes of aerobic exercise per week    Modality Duration Intensity   Warm-up 5 min  Slow/low resistance   Walk 30-40 minutes 2-3 mph   Treadmill 20-40 minutes 2.4 mph   Cool Down 5 min Slow/low resistance     Target Heart Rate:  As tolerated    Range of Perceived Exertion: 4-7    OMNI EFFORT SCALE  0  1  2  3 Not tired at all    A little tired     4  5  6  7   Getting more tired    Tired     8  9  10 Really tired    Very, very tired       Special Comments/ Recommendations:  -Lipid Profile with Physician  -continue to check your blood sugars 3-4x/day   -follow a diet low in saturated fats and salt    Stop Exercise!!! If any of the following occur:    Angina/chest pain    Dizziness    Excessive perspiration/cold sweats    Abnormal shortness of breath    Changes in heart rate (slow, fast, irregular)    Sudden fatigue or numbness    Nausea      Also...    Avoid extreme temperatures - exercise indoors if necessary    Wait at least 1 hour after a meal before strenuous activity    Do not exercise if you have a fever or are ill    Wear comfortable, supportive athletic clothing

## 2021-07-03 NOTE — ADDENDUM NOTE
Addendum Note by Sherry Meredith at 10/19/2020  3:30 PM     Author: Sherry Meredith Service: -- Author Type:     Filed: 10/22/2020  3:09 PM Encounter Date: 10/19/2020 Status: Signed    : Sherry Meredith ()    Addended by: SHERRY MEREDITH on: 10/22/2020 03:09 PM        Modules accepted: Orders

## 2021-07-03 NOTE — ADDENDUM NOTE
Addendum Note by Sherry Meredith at 9/1/2020  8:00 AM     Author: Sherry Meredith Service: -- Author Type:     Filed: 10/22/2020  2:30 PM Encounter Date: 9/1/2020 Status: Signed    : Sherry Meredith ()    Addended by: SHERRY MEREDITH on: 10/22/2020 02:30 PM        Modules accepted: Orders

## 2021-07-12 ENCOUNTER — HOSPITAL ENCOUNTER (OUTPATIENT)
Dept: CARDIAC REHAB | Facility: HOSPITAL | Age: 61
Discharge: HOME | End: 2021-07-12
Admitting: INTERNAL MEDICINE
Payer: COMMERCIAL

## 2021-07-12 PROCEDURE — 93798 PHYS/QHP OP CAR RHAB W/ECG: CPT

## 2021-07-14 ENCOUNTER — HOSPITAL ENCOUNTER (OUTPATIENT)
Dept: CARDIAC REHAB | Facility: HOSPITAL | Age: 61
End: 2021-07-14
Payer: COMMERCIAL

## 2021-07-14 PROCEDURE — 93798 PHYS/QHP OP CAR RHAB W/ECG: CPT

## 2021-07-19 ENCOUNTER — HOSPITAL ENCOUNTER (OUTPATIENT)
Dept: CARDIAC REHAB | Facility: HOSPITAL | Age: 61
End: 2021-07-19
Attending: INTERNAL MEDICINE
Payer: COMMERCIAL

## 2021-07-19 PROCEDURE — 93798 PHYS/QHP OP CAR RHAB W/ECG: CPT

## 2021-07-28 ENCOUNTER — HOSPITAL ENCOUNTER (OUTPATIENT)
Dept: CARDIAC REHAB | Facility: HOSPITAL | Age: 61
End: 2021-07-28
Attending: INTERNAL MEDICINE
Payer: COMMERCIAL

## 2021-07-28 PROCEDURE — 93798 PHYS/QHP OP CAR RHAB W/ECG: CPT

## 2021-07-30 ENCOUNTER — HOSPITAL ENCOUNTER (OUTPATIENT)
Dept: CARDIAC REHAB | Facility: HOSPITAL | Age: 61
End: 2021-07-30
Attending: INTERNAL MEDICINE
Payer: COMMERCIAL

## 2021-07-30 PROCEDURE — 93798 PHYS/QHP OP CAR RHAB W/ECG: CPT

## 2021-08-10 ENCOUNTER — HOSPITAL ENCOUNTER (OUTPATIENT)
Dept: CARDIAC REHAB | Facility: HOSPITAL | Age: 61
End: 2021-08-10
Attending: INTERNAL MEDICINE
Payer: COMMERCIAL

## 2021-08-10 PROCEDURE — 93798 PHYS/QHP OP CAR RHAB W/ECG: CPT

## 2021-08-12 ENCOUNTER — HOSPITAL ENCOUNTER (OUTPATIENT)
Dept: CARDIAC REHAB | Facility: HOSPITAL | Age: 61
End: 2021-08-12
Attending: INTERNAL MEDICINE
Payer: COMMERCIAL

## 2021-08-12 PROCEDURE — 93798 PHYS/QHP OP CAR RHAB W/ECG: CPT

## 2021-08-17 ENCOUNTER — HOSPITAL ENCOUNTER (OUTPATIENT)
Dept: CARDIAC REHAB | Facility: HOSPITAL | Age: 61
End: 2021-08-17
Attending: INTERNAL MEDICINE
Payer: COMMERCIAL

## 2021-08-17 PROCEDURE — 93798 PHYS/QHP OP CAR RHAB W/ECG: CPT

## 2021-08-24 ENCOUNTER — HOSPITAL ENCOUNTER (OUTPATIENT)
Dept: CARDIAC REHAB | Facility: HOSPITAL | Age: 61
End: 2021-08-24
Attending: INTERNAL MEDICINE
Payer: COMMERCIAL

## 2021-08-24 PROCEDURE — 93798 PHYS/QHP OP CAR RHAB W/ECG: CPT

## 2021-08-26 ENCOUNTER — HOSPITAL ENCOUNTER (OUTPATIENT)
Dept: CARDIAC REHAB | Facility: HOSPITAL | Age: 61
End: 2021-08-26
Attending: INTERNAL MEDICINE
Payer: COMMERCIAL

## 2021-08-26 PROCEDURE — 93798 PHYS/QHP OP CAR RHAB W/ECG: CPT

## 2021-08-29 ENCOUNTER — HEALTH MAINTENANCE LETTER (OUTPATIENT)
Age: 61
End: 2021-08-29

## 2021-08-31 ENCOUNTER — HOSPITAL ENCOUNTER (OUTPATIENT)
Dept: CARDIAC REHAB | Facility: HOSPITAL | Age: 61
End: 2021-08-31
Attending: INTERNAL MEDICINE
Payer: COMMERCIAL

## 2021-08-31 PROCEDURE — 93798 PHYS/QHP OP CAR RHAB W/ECG: CPT

## 2021-09-02 ENCOUNTER — HOSPITAL ENCOUNTER (OUTPATIENT)
Dept: CARDIAC REHAB | Facility: HOSPITAL | Age: 61
End: 2021-09-02
Attending: INTERNAL MEDICINE
Payer: COMMERCIAL

## 2021-09-02 PROCEDURE — 93798 PHYS/QHP OP CAR RHAB W/ECG: CPT

## 2021-09-07 ENCOUNTER — HOSPITAL ENCOUNTER (OUTPATIENT)
Dept: CARDIAC REHAB | Facility: HOSPITAL | Age: 61
End: 2021-09-07
Attending: INTERNAL MEDICINE
Payer: COMMERCIAL

## 2021-09-07 PROCEDURE — 93798 PHYS/QHP OP CAR RHAB W/ECG: CPT

## 2021-09-09 ENCOUNTER — HOSPITAL ENCOUNTER (OUTPATIENT)
Dept: CARDIAC REHAB | Facility: HOSPITAL | Age: 61
End: 2021-09-09
Attending: INTERNAL MEDICINE
Payer: COMMERCIAL

## 2021-09-09 PROCEDURE — 93798 PHYS/QHP OP CAR RHAB W/ECG: CPT

## 2021-09-16 ENCOUNTER — HOSPITAL ENCOUNTER (OUTPATIENT)
Dept: CARDIAC REHAB | Facility: HOSPITAL | Age: 61
End: 2021-09-16
Attending: INTERNAL MEDICINE
Payer: COMMERCIAL

## 2021-09-16 PROCEDURE — 93797 PHYS/QHP OP CAR RHAB WO ECG: CPT | Mod: XU

## 2021-09-16 PROCEDURE — 93798 PHYS/QHP OP CAR RHAB W/ECG: CPT

## 2021-10-24 ENCOUNTER — HEALTH MAINTENANCE LETTER (OUTPATIENT)
Age: 61
End: 2021-10-24

## 2021-12-19 ENCOUNTER — HEALTH MAINTENANCE LETTER (OUTPATIENT)
Age: 61
End: 2021-12-19

## 2022-02-13 ENCOUNTER — HEALTH MAINTENANCE LETTER (OUTPATIENT)
Age: 62
End: 2022-02-13

## 2022-04-10 ENCOUNTER — HEALTH MAINTENANCE LETTER (OUTPATIENT)
Age: 62
End: 2022-04-10

## 2022-07-31 ENCOUNTER — HEALTH MAINTENANCE LETTER (OUTPATIENT)
Age: 62
End: 2022-07-31

## 2022-10-15 ENCOUNTER — HEALTH MAINTENANCE LETTER (OUTPATIENT)
Age: 62
End: 2022-10-15

## 2022-12-03 ENCOUNTER — HEALTH MAINTENANCE LETTER (OUTPATIENT)
Age: 62
End: 2022-12-03

## 2023-03-26 ENCOUNTER — HEALTH MAINTENANCE LETTER (OUTPATIENT)
Age: 63
End: 2023-03-26

## 2023-08-26 ENCOUNTER — HEALTH MAINTENANCE LETTER (OUTPATIENT)
Age: 63
End: 2023-08-26

## 2024-01-07 ENCOUNTER — HEALTH MAINTENANCE LETTER (OUTPATIENT)
Age: 64
End: 2024-01-07

## 2024-05-26 ENCOUNTER — HEALTH MAINTENANCE LETTER (OUTPATIENT)
Age: 64
End: 2024-05-26

## 2024-10-13 ENCOUNTER — HEALTH MAINTENANCE LETTER (OUTPATIENT)
Age: 64
End: 2024-10-13